# Patient Record
Sex: MALE | Race: WHITE
[De-identification: names, ages, dates, MRNs, and addresses within clinical notes are randomized per-mention and may not be internally consistent; named-entity substitution may affect disease eponyms.]

---

## 2020-06-05 ENCOUNTER — HOSPITAL ENCOUNTER (OUTPATIENT)
Dept: HOSPITAL 75 - PREOP | Age: 60
Discharge: HOME | End: 2020-06-05
Attending: UROLOGY
Payer: COMMERCIAL

## 2020-06-05 VITALS — WEIGHT: 285.5 LBS | HEIGHT: 70 IN | BODY MASS INDEX: 40.87 KG/M2

## 2020-06-05 DIAGNOSIS — R33.9: ICD-10-CM

## 2020-06-05 DIAGNOSIS — Z01.812: Primary | ICD-10-CM

## 2020-06-05 DIAGNOSIS — Z20.828: ICD-10-CM

## 2020-06-05 DIAGNOSIS — N40.0: ICD-10-CM

## 2020-06-05 PROCEDURE — 87635 SARS-COV-2 COVID-19 AMP PRB: CPT

## 2020-06-10 ENCOUNTER — HOSPITAL ENCOUNTER (OUTPATIENT)
Dept: HOSPITAL 75 - SDC | Age: 60
LOS: 2 days | Discharge: HOME | End: 2020-06-12
Attending: UROLOGY
Payer: COMMERCIAL

## 2020-06-10 VITALS — SYSTOLIC BLOOD PRESSURE: 131 MMHG | DIASTOLIC BLOOD PRESSURE: 73 MMHG

## 2020-06-10 VITALS — DIASTOLIC BLOOD PRESSURE: 66 MMHG | SYSTOLIC BLOOD PRESSURE: 160 MMHG

## 2020-06-10 VITALS — DIASTOLIC BLOOD PRESSURE: 83 MMHG | SYSTOLIC BLOOD PRESSURE: 159 MMHG

## 2020-06-10 VITALS — DIASTOLIC BLOOD PRESSURE: 74 MMHG | SYSTOLIC BLOOD PRESSURE: 132 MMHG

## 2020-06-10 VITALS — DIASTOLIC BLOOD PRESSURE: 81 MMHG | SYSTOLIC BLOOD PRESSURE: 147 MMHG

## 2020-06-10 VITALS — BODY MASS INDEX: 40.87 KG/M2 | WEIGHT: 285.5 LBS | HEIGHT: 70 IN

## 2020-06-10 VITALS — DIASTOLIC BLOOD PRESSURE: 84 MMHG | SYSTOLIC BLOOD PRESSURE: 158 MMHG

## 2020-06-10 VITALS — DIASTOLIC BLOOD PRESSURE: 90 MMHG | SYSTOLIC BLOOD PRESSURE: 156 MMHG

## 2020-06-10 VITALS — DIASTOLIC BLOOD PRESSURE: 79 MMHG | SYSTOLIC BLOOD PRESSURE: 135 MMHG

## 2020-06-10 VITALS — DIASTOLIC BLOOD PRESSURE: 82 MMHG | SYSTOLIC BLOOD PRESSURE: 155 MMHG

## 2020-06-10 VITALS — DIASTOLIC BLOOD PRESSURE: 83 MMHG | SYSTOLIC BLOOD PRESSURE: 144 MMHG

## 2020-06-10 DIAGNOSIS — Z11.2: ICD-10-CM

## 2020-06-10 DIAGNOSIS — N31.9: ICD-10-CM

## 2020-06-10 DIAGNOSIS — N32.81: ICD-10-CM

## 2020-06-10 DIAGNOSIS — N40.1: Primary | ICD-10-CM

## 2020-06-10 DIAGNOSIS — R32: ICD-10-CM

## 2020-06-10 LAB
BASOPHILS # BLD AUTO: 0 10^3/UL (ref 0–0.1)
BASOPHILS NFR BLD AUTO: 0 % (ref 0–10)
EOSINOPHIL # BLD AUTO: 0.3 10^3/UL (ref 0–0.3)
EOSINOPHIL NFR BLD AUTO: 4 % (ref 0–10)
ERYTHROCYTE [DISTWIDTH] IN BLOOD BY AUTOMATED COUNT: 14.3 % (ref 10–14.5)
HCT VFR BLD CALC: 35 % (ref 40–54)
HGB BLD-MCNC: 11.4 G/DL (ref 13.3–17.7)
LYMPHOCYTES # BLD AUTO: 1.7 X 10^3 (ref 1–4)
LYMPHOCYTES NFR BLD AUTO: 20 % (ref 12–44)
MANUAL DIFFERENTIAL PERFORMED BLD QL: NO
MCH RBC QN AUTO: 28 PG (ref 25–34)
MCHC RBC AUTO-ENTMCNC: 32 G/DL (ref 32–36)
MCV RBC AUTO: 85 FL (ref 80–99)
MONOCYTES # BLD AUTO: 0.7 X 10^3 (ref 0–1)
MONOCYTES NFR BLD AUTO: 8 % (ref 0–12)
NEUTROPHILS # BLD AUTO: 5.9 X 10^3 (ref 1.8–7.8)
NEUTROPHILS NFR BLD AUTO: 69 % (ref 42–75)
PLATELET # BLD: 269 10^3/UL (ref 130–400)
PMV BLD AUTO: 9.7 FL (ref 7.4–10.4)
WBC # BLD AUTO: 8.6 10^3/UL (ref 4.3–11)

## 2020-06-10 PROCEDURE — 87081 CULTURE SCREEN ONLY: CPT

## 2020-06-10 PROCEDURE — 36415 COLL VENOUS BLD VENIPUNCTURE: CPT

## 2020-06-10 PROCEDURE — 94664 DEMO&/EVAL PT USE INHALER: CPT

## 2020-06-10 PROCEDURE — 86901 BLOOD TYPING SEROLOGIC RH(D): CPT

## 2020-06-10 PROCEDURE — 82962 GLUCOSE BLOOD TEST: CPT

## 2020-06-10 PROCEDURE — 86850 RBC ANTIBODY SCREEN: CPT

## 2020-06-10 PROCEDURE — 86900 BLOOD TYPING SEROLOGIC ABO: CPT

## 2020-06-10 PROCEDURE — 85025 COMPLETE CBC W/AUTO DIFF WBC: CPT

## 2020-06-10 RX ADMIN — PANTOPRAZOLE SODIUM SCH MG: 40 TABLET, DELAYED RELEASE ORAL at 12:41

## 2020-06-10 RX ADMIN — SODIUM CHLORIDE ONE MLS/HR: 900 INJECTION INTRAVENOUS at 19:47

## 2020-06-10 RX ADMIN — LOSARTAN POTASSIUM SCH MG: 100 TABLET, FILM COATED ORAL at 12:41

## 2020-06-10 RX ADMIN — SODIUM CHLORIDE, SODIUM LACTATE, POTASSIUM CHLORIDE, AND CALCIUM CHLORIDE SCH MLS/HR: 600; 310; 30; 20 INJECTION, SOLUTION INTRAVENOUS at 22:51

## 2020-06-10 RX ADMIN — HYDROCODONE BITARTRATE AND ACETAMINOPHEN PRN EA: 10; 325 TABLET ORAL at 22:50

## 2020-06-10 RX ADMIN — SODIUM CHLORIDE ONE MLS/HR: 900 INJECTION INTRAVENOUS at 19:46

## 2020-06-10 RX ADMIN — DOCUSATE SODIUM SCH MG: 100 CAPSULE ORAL at 20:04

## 2020-06-10 RX ADMIN — HYDROCODONE BITARTRATE AND ACETAMINOPHEN PRN EA: 10; 325 TABLET ORAL at 12:18

## 2020-06-10 RX ADMIN — SODIUM CHLORIDE, SODIUM LACTATE, POTASSIUM CHLORIDE, AND CALCIUM CHLORIDE SCH MLS/HR: 600; 310; 30; 20 INJECTION, SOLUTION INTRAVENOUS at 17:05

## 2020-06-10 RX ADMIN — HYDROCODONE BITARTRATE AND ACETAMINOPHEN PRN EA: 10; 325 TABLET ORAL at 15:35

## 2020-06-10 RX ADMIN — METFORMIN HYDROCHLORIDE SCH MG: 500 TABLET, EXTENDED RELEASE ORAL at 17:03

## 2020-06-10 RX ADMIN — SODIUM CHLORIDE, SODIUM LACTATE, POTASSIUM CHLORIDE, AND CALCIUM CHLORIDE SCH MLS/HR: 600; 310; 30; 20 INJECTION, SOLUTION INTRAVENOUS at 12:41

## 2020-06-10 NOTE — XMS REPORT
[facility] Clinical Summary

                             Created on: 2017



Loki Tao

External Reference #: [id number]

: 1960

Sex: Male



Demographics





                          Address                   [address] [place] [address]

 

                          Home Phone                [email or web address]

 

                          Preferred Language        English

 

                          Marital Status            M

 

                          Hoahaoism Affiliation     Unknown

 

                          Race                      Unknown

 

                          Ethnic Group              Not  or 





Author





                          Author                    Admin, Loki AQUINO

 

                          Organization              [facility]

 

                          Address                   Unknown

 

                          Phone                     Unavailable







Allergies, Adverse Reactions, Alerts





           Allergy Name Reaction Description Start Date Severity   Status     Pr

ovider

 

           NKA                    Critical   Active     Kenny aguilera MD







Conditions or Problems





        Problem Name Problem Code Onset Date Status  Entry Date Provider Comment

 Standard

Description                             Annotate

 

             Umbilical hernia without obstruction or gangrene                 Resolved     

2017/10/31          Kenny Dwyer MD                     Umbilical hernia 

without mention of 

obstruction or gangrene                  

 

        Ventral hernia 553.20   Resolved 2017/10/31 Kenny laboy MD         

Unspecified ventral hernia without mention of obstruction or gangrene  

 

             Umbilical hernia without obstruction or gangrene              2017/

09/26   2017/10/31   

Inactive            Kenny Dwyer MD 2017/10/31           

 

          Ventral hernia ICD-553.20  2017/10/31 Inactive  Kenny Conway MD 

2017/10/31                               







Medication List





        Medication Instructions Start Date Stop Date Generic Name NDC     Status

  Provider 

Patient Instruction

 

          COZAAR 50 MG ORAL TABS 1 tab daily            LOSARTAN POTAS

SIUM [id number] 

Active              Caitlyn Moreno RN                     Active

 

          OMEPRAZOLE 20 MG ORAL CPDR 1 tab daily            OMEPRAZOLE

 [id number] 

Active              Caitlyn Moreno RN                     Active







Advance Directives





                          Directive Description     Start Date

 

                          PERMISSION TO SHARE       







Vital Signs





           Date       Name       Value      Unit       Range      Description

 

           2017/10/31 blood pressure, diastolic 80         mm[Hg]               

 BP sarah

 

           2017/10/31 blood pressure, systolic 160        mm[Hg]                

BP sys

 

           2017/10/31 height E&M 69         [in_us]               Bdy height

 

           2017/10/31 pulse rate E&M 70         /min                  Heart rate

 

           2017/10/31 temperature E&M 99.1       [degF]                Body temp

erature

 

           2017/10/31 weight E&M 312.4      [lb_av]               Weight Measure

d

 

            blood pressure, diastolic 90         mm[Hg]               

 BP sarah

 

            blood pressure, systolic 150        mm[Hg]                

BP sys

 

            height E&M 69         [in_us]               Bdy height

 

            pulse rate E&M 74         /min                  Heart rate

 

            temperature E&M 98.8       [degF]                Body temp

erature

 

            weight E&M 305        [lb_av]               Weight Measure

d







Encounters





             Code         Encounter    Date         Provider     Facility

 

             CPT-30797    Level 4 New Patient  13:26:59 CDT Kenny Dwyer MD 

[facility]







Procedures





             Code         Procedure Name Date         Entry Date   Standard Desc

ription

 

             CPT-99730    Postop F/U Visit 2017/10/31 14:49:05 CDT 2017/10/31

## 2020-06-10 NOTE — PROGRESS NOTE-POST OPERATIVE
Post-Operative Progess Note


Surgeon (s)/Assistant (s)


Surgeon


ELIAS TAWIL MD


Assistant:  NONE





Pre-Operative Diagnosis


BPH, NEUROGENIC BLADDER WITH RETENTION AND INCONTINENCE





Post-Operative Diagnosis





SAME





Procedure & Operative Findings


Date of Procedure


6/10/20


Procedure Performed/Findings


TURP


Anesthesia Type


SPINAL





Estimated Blood Loss


Estimated blood loss (mL):  150cc





Specimens/Packing


Specimens Removed


PROSTATE CHIPS


Packing:  


NONE











TAWIL,ELIAS A MD               Víctor 10, 2020 07:06

## 2020-06-10 NOTE — XMS REPORT
[facility] Clinical Summary

                             Created on: 10/10/2017



Loki Tao

External Reference #: [id number]

: 1960

Sex: Male



Demographics





                          Address                   [address] [place] [address]

 

                          Home Phone                [email or web address]

 

                          Preferred Language        English

 

                          Marital Status            M

 

                          Yarsanism Affiliation     Unknown

 

                          Race                      Unknown

 

                          Ethnic Group              Not  or 





Author





                          Author                    Admin, Loki AQUINO

 

                          Organization              [facility]

 

                          Address                   Unknown

 

                          Phone                     Unavailable







Allergies, Adverse Reactions, Alerts





           Allergy Name Reaction Description Start Date Severity   Status     Pr

ovider

 

           NKA                    Critical   Active     Kenny aguilera MD







Conditions or Problems





        Problem Name Problem Code Onset Date Status  Entry Date Provider Comment

 Standard

Description                             Annotate

 

           Umbilical hernia without obstruction or gangrene            

6 Active      

Kenny Dwyer MD                              Umbilical hernia without men

tion of obstruction or 

gangrene                                 

 

        Ventral hernia 553.20   Active   Kenny Dwyer MD         

Unspecified ventral hernia without mention of obstruction or gangrene  







Medication List





        Medication Instructions Start Date Stop Date Generic Name NDC     Status

  Provider 

Patient Instruction

 

          COZAAR 50 MG ORAL TABS 1 tab daily            LOSARTAN POTAS

SIUM [id number] 

Active              Caitlyn Moreno RN                     Active

 

          OMEPRAZOLE 20 MG ORAL CPDR 1 tab daily            OMEPRAZOLE

 [id number] 

Active              Caitlyn Moreno RN                     Active







Advance Directives





                          Directive Description     Start Date

 

                          PERMISSION TO SHARE       







Vital Signs





           Date       Name       Value      Unit       Range      Description

 

            blood pressure, diastolic 90         mm[Hg]               

 BP sarah

 

            blood pressure, systolic 150        mm[Hg]                

BP sys

 

            height E&M 69         [in_us]               Bdy height

 

            pulse rate E&M 74         /min                  Heart rate

 

            temperature E&M 98.8       [degF]                Body temp

erature

 

            weight E&M 305        [lb_av]               Weight Measure

d







Encounters





             Code         Encounter    Date         Provider     Facility

 

             CPT-23574    Level 4 New Patient  13:26:59 CDT Kenny Dwyer MD 

[facility]

## 2020-06-10 NOTE — XMS REPORT
[facility] Clinical Summary

                             Created on: 10/02/2017



Loki Tao

External Reference #: [id number]

: 1960

Sex: Male



Demographics





                          Address                   [address] [place] [address]

 

                          Home Phone                [email or web address]

 

                          Preferred Language        English

 

                          Marital Status            M

 

                          Gnosticist Affiliation     Unknown

 

                          Race                      Unknown

 

                          Ethnic Group              Not  or 





Author





                          Author                    Admin, Loki AQUINO

 

                          Organization              [facility]

 

                          Address                   Unknown

 

                          Phone                     Unavailable







Allergies, Adverse Reactions, Alerts





           Allergy Name Reaction Description Start Date Severity   Status     Pr

ovider

 

           NKA                    Critical   Active     Kenny aguilera MD







Conditions or Problems





        Problem Name Problem Code Onset Date Status  Entry Date Provider Comment

 Standard

Description                             Annotate

 

           Umbilical hernia without obstruction or gangrene            

6 Active      

Kenny Dwyer MD                              Umbilical hernia without men

tion of obstruction or 

gangrene                                 

 

        Ventral hernia 553.20   Active   Kenny Dwyer MD         

Unspecified ventral hernia without mention of obstruction or gangrene  







Medication List





        Medication Instructions Start Date Stop Date Generic Name NDC     Status

  Provider 

Patient Instruction

 

          COZAAR 50 MG ORAL TABS 1 tab daily            LOSARTAN POTAS

SIUM [id number] 

Active              Caitlyn Moreno RN                     Active

 

          OMEPRAZOLE 20 MG ORAL CPDR 1 tab daily            OMEPRAZOLE

 [id number] 

Active              Caitlyn Moreno RN                     Active







Advance Directives





                          Directive Description     Start Date

 

                          PERMISSION TO SHARE       







Vital Signs





           Date       Name       Value      Unit       Range      Description

 

            blood pressure, diastolic 90         mm[Hg]               

 BP sarah

 

            blood pressure, systolic 150        mm[Hg]                

BP sys

 

            height E&M 69         [in_us]               Bdy height

 

            pulse rate E&M 74         /min                  Heart rate

 

            temperature E&M 98.8       [degF]                Body temp

erature

 

            weight E&M 305        [lb_av]               Weight Measure

d







Encounters





             Code         Encounter    Date         Provider     Facility

 

             CPT-82187    Level 4 New Patient  13:26:59 CDT Kenny Dwyer MD 

[facility]

## 2020-06-10 NOTE — PROGRESS NOTE-PRE OPERATIVE
Pre-Operative Progress Note


H&P Reviewed


The H&P was reviewed, patient examined and no changes noted.


Date Seen by Provider:  Víctor 10, 2020


Time Seen by Provider:  07:02


Date H&P Reviewed:  Víctor 10, 2020


Time H&P Reviewed:  07:02


Pre-Operative Diagnosis:  BPH, NEUROGENIC BLADDER WITH RETENTION AND 

INCONTINENCE











TAWIL,ELIAS A MD               Víctor 10, 2020 07:03

## 2020-06-10 NOTE — XMS REPORT
[facility] Clinical Summary

                             Created on: 2017



Loki Tao

External Reference #: [id number]

: 1960

Sex: Male



Demographics





                          Address                   [address] [place] [address]

 

                          Home Phone                [email or web address]

 

                          Preferred Language        English

 

                          Marital Status            M

 

                          Christianity Affiliation     Unknown

 

                          Race                      Unknown

 

                          Ethnic Group              Not  or 





Author





                          Author                    Admin, Loki AQUINO

 

                          Organization              [facility]

 

                          Address                   Unknown

 

                          Phone                     Unavailable







Allergies, Adverse Reactions, Alerts





           Allergy Name Reaction Description Start Date Severity   Status     Pr

ovider

 

           NKA                    Critical   Active     Kenny aguilera MD







Conditions or Problems





        Problem Name Problem Code Onset Date Status  Entry Date Provider Comment

 Standard

Description                             Annotate

 

           Umbilical hernia without obstruction or gangrene            

6 Active      

Kenny Dwyer MD                              Umbilical hernia without men

tion of obstruction or 

gangrene                                 

 

        Ventral hernia 553.20   Active   Kenny Dwyer MD         

Unspecified ventral hernia without mention of obstruction or gangrene  







Medication List





        Medication Instructions Start Date Stop Date Generic Name NDC     Status

  Provider 

Patient Instruction

 

          COZAAR 50 MG ORAL TABS 1 tab daily            LOSARTAN POTAS

SIUM [id number] 

Active              Caitlyn Moreno RN                     Active

 

          OMEPRAZOLE 20 MG ORAL CPDR 1 tab daily            OMEPRAZOLE

 [id number] 

Active              Caitlyn Moreno RN                     Active







Vital Signs





           Date       Name       Value      Unit       Range      Description

 

            blood pressure, diastolic 90         mm[Hg]               

 BP sarah

 

            blood pressure, systolic 150        mm[Hg]                

BP sys

 

            height E&M 69         [in_us]               Bdy height

 

            pulse rate E&M 74         /min                  Heart rate

 

            temperature E&M 98.8       [degF]                Body temp

erature

 

            weight E&M 305        [lb_av]               Weight Measure

d







Encounters





             Code         Encounter    Date         Provider     Facility

 

             CPT-77628    Level 4 New Patient  13:26:59 CDT Kenny Dwyer MD 

[facility]

## 2020-06-10 NOTE — XMS REPORT
[facility] Ambulatory Summary

                             Created on: 2019



Loki Tao

External Reference #: [id number]

: 1960

Sex: Male



Demographics





                          Address                   [address] [place]  [address]

 

                          Home Phone                [phone]

 

                          Preferred Language        English

 

                          Marital Status            Never 

 

                          Sikh Affiliation     Unknown

 

                          Race                      White

 

                          Ethnic Group              Unknown





Author





                          Author                    Loki Coronel

 

                          Organization              [facility] 

oup

 

                          Address                   [address]

[place], [place]  [address]



 

                          Phone                     [phone]







Care Team Providers





                    Care Team Member Name Role                Phone

 

                    Francis Coronel   PCP                 [phone]

 

                    Francis Coronel   PreferredProvider   [phone]







Allergies and Adverse Reactions





                    Name                Reaction            Notes

 

                    No known drug allergy                      







Plan of Treatment





             Planned Activity Comments     Planned Date Planned Time Plan/Goal

 

             URINALYSIS ROUTINE C&S IF IND              4/3/2019     12:00 AM   

   







Medications





                                        Active 

 

             Name         Start Date   Estimated Completion Date SIG          Co

mments

 

             losartan 100 mg oral tablet                           take 1 tablet

 (100 mg) by oral route once daily 



 

                omeprazole 20 mg oral capsule,delayed release(DR/EC)            

                     take 1 capsule (20 mg) 

by oral route once daily before a meal   

 

                atorvastatin 20 mg oral tablet                                 t

jackelyn 1 tablet (20 mg) by oral route once 

daily                                    

 

                levothyroxine 25 mcg oral tablet                                

 take 1 tablet (25 mcg) by oral route once 

daily                                    

 

                metformin 500 mg oral tablet                                 porfirio

e 1 tablet (500 mg) by oral route 2 times 

per day with morning and evening meals   







Problem List





                    Description         Status              Onset

 

                    Acute cystitis without hematuria Active              4/3/201

9







Vital Signs





     Date Time BP-Sys(mm[Hg] BP-Alexsandra(mm[Hg]) HR(bpm) RR(rpm) Temp WT   HT   HC   

BMI  BSA  BMI

 Percentile                             O2 Sat(%)

 

      4/3/2019 8:34:00  mmHg 78 mmHg 76 bpm       98 F  292 lbs 69 in     

  43.1204 

kg/m              2.5393 m                              93 %







Social History





                    Name                Description         Comments

 

                    Tobacco             Never smoker         







History of Procedures

Not available.



Results Summary

Not available.



History Of Immunizations

Not available.



History of Past Illness





                    Name                Date of Onset       Comments

 

                    Type 2 diabetes mellitus                      

 

                    Hypothyroid                              

 

                    High cholesterol                         

 

                    HTN (hypertension)                       

 

                    Acute cystitis without hematuria 2019           

 

                    Dysuria             Apr  3 2019  8:54AM  







Payers





           Insurance Name Company Name Plan Name  Plan Number Policy Number Yahir

cy Group 

Number                                  Start Date

 

                    [facility] [address] [facility]           [id number]

2           N/A







History of Encounters





                    Visit Date          Visit Type          Provider

 

                    4/3/2019            Office visit        Francis KNIGHT

## 2020-06-10 NOTE — XMS REPORT
[facility] Clinical Summary

                             Created on: 2017



Loki Tao

External Reference #: [id number]

: 1960

Sex: Male



Demographics





                          Home Phone                [phone]

 

                          Preferred Language        Unknown

 

                          Marital Status            Unknown

 

                          Yazidi Affiliation     Unknown

 

                          Race                      Unknown

 

                          Ethnic Group              Unknown





Author





                          Author                    Admin, Loki WISE

 

                          Organization              [facility]

 

                          Address                   Unknown

 

                          Phone                     Unavailable







Allergies, Adverse Reactions, Alerts





           Allergy Name Reaction Description Start Date Severity   Status     Pr

ovider

 

           Allergies Unknown                                              







Conditions or Problems





        Problem Name Problem Code Onset Date Status  Entry Date Provider Comment

 Standard

Description                             Annotate

 

        Problems Unknown                 Active                                 

  







Medication List





        Medication Instructions Start Date Stop Date Generic Name NDC     Status

  Provider 

Patient Instruction

 

          COZAAR 50 MG ORAL TABS 1 tab daily            LOSARTAN POTAS

SIUM [id number] 

Active              Caitlyn Moreno RN                     Active

 

          OMEPRAZOLE 20 MG ORAL CPDR 1 tab daily            OMEPRAZOLE

 [id number] 

Active              Caitlyn Moreno RN                     Active

## 2020-06-10 NOTE — XMS REPORT
MU2 Ambulatory Summary

                             Created on: 2019



Loki Tao

External Reference #: [id number]

: 1960

Sex: Male



Demographics





                          Address                   [address] [place]  [address]

 

                          Home Phone                [phone]

 

                          Preferred Language        English

 

                          Marital Status            Never 

 

                          Latter-day Affiliation     Unknown

 

                          Race                      White

 

                          Ethnic Group              Unknown





Author





                          Author                    Loki Coronel

 

                          Organization              [facility]

oup

 

                          Address                   [address]

[place], [place]  [address]



 

                          Phone                     [phone]







Care Team Providers





                    Care Team Member Name Role                Phone

 

                    Francis Coronel   PCP                 [phone]

 

                    Francis Coronel   PreferredProvider   [phone]







Allergies and Adverse Reactions





                    Name                Reaction            Notes

 

                    No known drug allergy                      







Plan of Treatment





             Planned Activity Comments     Planned Date Planned Time Plan/Goal

 

             URINALYSIS ROUTINE C&S IF IND              4/3/2019     12:00 AM   

   

 

             Urine dipstick test without microscopy              4/3/2019     12

:00 AM      







Medications





                                        Active 

 

             Name         Start Date   Estimated Completion Date SIG          Co

mments

 

             losartan 100 mg oral tablet                           take 1 tablet

 (100 mg) by oral route once daily 



 

                omeprazole 20 mg oral capsule,delayed release(DR/EC)            

                     take 1 capsule (20 mg) 

by oral route once daily before a meal   

 

                atorvastatin 20 mg oral tablet                                 t

jackelyn 1 tablet (20 mg) by oral route once 

daily                                    

 

                levothyroxine 25 mcg oral tablet                                

 take 1 tablet (25 mcg) by oral route once 

daily                                    

 

                metformin 500 mg oral tablet                                 porfirio

e 1 tablet (500 mg) by oral route 2 times 

per day with morning and evening meals   

 

                Bactrim -160 mg oral tablet 4/3/2019        2019     

  take 1 tablet by oral route

every 12 hours for 10 days               







Problem List





                    Description         Status              Onset

 

                    Acute cystitis without hematuria Active              4/3/201

9

 

                                        Diabetes mellitus due to underlying cond

ition without complication, without 

long-term current use of insulin Active                    4/3/2019

 

                    Other specified hypothyroidism Active              4/3/2019

 

                    Pure hypercholesterolemia Active              4/3/2019

 

                    Essential hypertension Active              4/3/2019







Vital Signs





     Date Time BP-Sys(mm[Hg] BP-Alexsandra(mm[Hg]) HR(bpm) RR(rpm) Temp WT   HT   HC   

BMI  BSA  BMI

 Percentile                             O2 Sat(%)

 

      4/3/2019 8:34:00  mmHg 78 mmHg 76 bpm       98 F  292 lbs 69 in     

  43.1204 

kg/m              2.5393 m                              93 %







Social History





                    Name                Description         Comments

 

                    Tobacco             Never smoker         







History of Procedures

Not available.



Results Summary

Not available.



History Of Immunizations

Not available.



History of Past Illness





                    Name                Date of Onset       Comments

 

                    Type 2 diabetes mellitus                      

 

                    Hypothyroid                              

 

                    High cholesterol                         

 

                    HTN (hypertension)                       

 

                    Acute cystitis without hematuria 2019           

 

                                        Diabetes mellitus due to underlying cond

ition without complication, without 

long-term current use of insulin 2019                 

 

                    Other specified hypothyroidism 2019           

 

                    Pure hypercholesterolemia 2019           

 

                    Essential hypertension 2019           

 

                    Dysuria             Apr  3 2019  8:54AM  

 

                    Acute cystitis without hematuria Apr  3 2019  8:37AM  

 

                                        Diabetes mellitus due to underlying cond

ition without complication, without 

long-term current use of insulin Apr  3 2019  8:37AM        

 

                    Essential hypertension Apr  3 2019  8:37AM  

 

                    Pure hypercholesterolemia Apr  3 2019  8:37AM  

 

                    Other specified hypothyroidism Apr  3 2019  8:37AM  

 

                    Dysuria             Apr  3 2019  9:03AM  







Payers





           Insurance Name Company Name Plan Name  Plan Number Policy Number [id number]

cy Group 

Number                                  Start Date

 

                    [facility] [address] [facility]           [id number]

2           N/A







History of Encounters





                    Visit Date          Visit Type          Provider

 

                    4/3/2019            Office visit        Francis KNIGHT

## 2020-06-10 NOTE — XMS REPORT
[facility] Clinical Summary

                             Created on: 2017



Dinh Loki

External Reference #: [id number]

: 1960

Sex: Male



Demographics





                          Address                   [address] [place] [address]

 

                          Home Phone                [phone]

 

                          Preferred Language        English

 

                          Marital Status            M

 

                          Confucianist Affiliation     Unknown

 

                          Race                      Unknown

 

                          Ethnic Group              Not  or 





Author





                          Author                    Admin, Loki WISE

 

                          Organization              [facility]

 

                          Address                   Unknown

 

                          Phone                     Unavailable







Allergies, Adverse Reactions, Alerts





           Allergy Name Reaction Description Start Date Severity   Status     Pr

ovider

 

           Allergies Unknown                                              







Conditions or Problems





        Problem Name Problem Code Onset Date Status  Entry Date Provider Comment

 Standard

Description                             Annotate

 

        Problems Unknown                 Active                                 

  







Medication List





        Medication Instructions Start Date Stop Date Generic Name NDC     Status

  Provider 

Patient Instruction

 

          COZAAR 50 MG ORAL TABS 1 tab daily            LOSARTAN POTAS

SIUM [id number] 

Active              Caitlyn Moreno RN                     Active

 

          OMEPRAZOLE 20 MG ORAL CPDR 1 tab daily            OMEPRAZOLE

 [id number] 

Active              Caitlyn Moreno RN                     Active

## 2020-06-10 NOTE — XMS REPORT
[facility] Clinical Summary

                             Created on: 10/20/2017



Loki Tao

External Reference #: [id number]

: 1960

Sex: Male



Demographics





                          Address                   [address] [place] [address]

 

                          Home Phone                [email or web address]

 

                          Preferred Language        English

 

                          Marital Status            M

 

                          Jew Affiliation     Unknown

 

                          Race                      Unknown

 

                          Ethnic Group              Not  or 





Author





                          Author                    Admin, Loki AQUINO

 

                          Organization              [facility]

 

                          Address                   Unknown

 

                          Phone                     Unavailable







Allergies, Adverse Reactions, Alerts





           Allergy Name Reaction Description Start Date Severity   Status     Pr

ovider

 

           NKA                    Critical   Active     Kenny aguilera MD







Conditions or Problems





        Problem Name Problem Code Onset Date Status  Entry Date Provider Comment

 Standard

Description                             Annotate

 

           Umbilical hernia without obstruction or gangrene            

6 Active      

Kenny Dwyer MD                              Umbilical hernia without men

tion of obstruction or 

gangrene                                 

 

        Ventral hernia 553.20   Active   Kenny Dwyer MD         

Unspecified ventral hernia without mention of obstruction or gangrene  







Medication List





        Medication Instructions Start Date Stop Date Generic Name NDC     Status

  Provider 

Patient Instruction

 

          COZAAR 50 MG ORAL TABS 1 tab daily            LOSARTAN POTAS

SIUM [id number] 

Active              Caitlyn Moreno RN                     Active

 

          OMEPRAZOLE 20 MG ORAL CPDR 1 tab daily            OMEPRAZOLE

 [id number] 

Active              Caitlyn Moreno RN                     Active







Advance Directives





                          Directive Description     Start Date

 

                          PERMISSION TO SHARE       







Vital Signs





           Date       Name       Value      Unit       Range      Description

 

            blood pressure, diastolic 90         mm[Hg]               

 BP sarah

 

            blood pressure, systolic 150        mm[Hg]                

BP sys

 

            height E&M 69         [in_us]               Bdy height

 

            pulse rate E&M 74         /min                  Heart rate

 

            temperature E&M 98.8       [degF]                Body temp

erature

 

            weight E&M 305        [lb_av]               Weight Measure

d







Encounters





             Code         Encounter    Date         Provider     Facility

 

             [id number]    Level 4 New Patient  13:26:59 CDT Kenny Dwyer MD 

[facility]

## 2020-06-10 NOTE — NUR
RECEIVED FROM RECOVERY POST TURP, REPORT FROM DANIEL HAWLEY, PATIENT ALERT AND ORIENTED, CBI OF NS 
CONTINUED, PINK TINGED URINE, IV SITE WITHOUT REDNESS OR SWELLING, DENIES PAIN AT THIS TIME, 
O2 SAT 98 PERCENT, SCD'S ON, ORIENTED TO ROOM, CALL LIGHT WITHIN REACH.

## 2020-06-10 NOTE — XMS REPORT
Continuity of Care Document

                             Created on: 06/10/2020



ANTON QUINONES

External Reference #: [id number]

: 1960

Sex: Male



Demographics





                          Home Phone                [phone]

 

                          Preferred Language        Unknown

 

                          Marital Status            Unknown

 

                          Temple Affiliation     Unknown

 

                          Race                      Unknown

 

                          Ethnic Group              Unknown





Author





                          Organization              Unknown

 

                          Address                   Unknown

 

                          Phone                     Unavailable



              



Allergies

      



             Active           Description           Code           Type         

  Severity   

                Reaction           Onset           Reported/Identified          

 

Relationship to Patient                 Clinical Status        

 

             Yes           No known allergies                        Drug       

    N/A       

             N/A                                                           

 

           Yes           NKDA                                   N/A           N/

A          

                                                                         

 

             Yes           No Known Drug Allergies           [id number]           

             

N/A           N/A                                                           

 

                Yes             No Known Environmental Allergies           51040

997               

           N/A           N/A                                                    

    

 

             Yes           No Known Food Allergies           [id number]           

             

N/A           N/A                                                           

 

                Yes             No Known Drug Allergies           [id number]    

       Drug 

Allergy           Unknown           N/A                             2020  

      

                                                             



                            



Medications

      



                Medication           Packaging           Start Date           St

op Date         

                    Route               Dosage              Sig        

 

                            LISINOPRIL                                     08/15

/2017               

                    ORAL                30                              daily   

               

 

                            LEVAQUIN                                     08/15/2

017                 

                    ORAL                7                               daily   

               

 

                                ALBUTEROL SULFATE                               

          08/15/2017        

                                Inhalation           2                          

 every 6 hours        

         

 

                            ZITHROMAX                                     2017                

                    ORAL                6                                       

          

 

                            PREDNISONE                                                    

                    ORAL                9                                       

          



                          



Problems

      



             Date Dx Coded           Attending           Type           Code    

       

Diagnosis                               Diagnosed By        

 

             2018           DERREK GODINEZ            

J40           

Bronchitis, not specified as acute or chronic                    

 

                2018           DERREK GODINEZ         

      E11.9         

                          Type 2 diabetes mellitus without complications        

            

 

             2019                        Final           E03.9           H

ypothyroidism,

unspecified                                      

 

             2019                        Reason For Visit           E11.9 

          Type

2 diabetes mellitus without complications                    

 

             2019                        Final           E78.00           

Pure 

hypercholesterolemia, unspecified                    

 

             2020                        S            E039           Hypot

hyroidism, 

unspecified                                      

 

             2020                        S            E119           Type 

2 diabetes 

mellitus without complications                    

 

             2020                        S            E669           Obesi

ty, unspecified

                                                 

 

             2020                        S                       Pure

 

hypercholesterolemia, unspecified                    

 

             2020                        S            I10           Essent

ial (primary) 

hypertension                                     

 

             2020                        S            K219           Gastr

o-esophageal 

reflux disease without esophagitis                    

 

             2020                        P                       Unil

ateral primary 

osteoarthritis, right knee                       

 

             2020                        S                       Body

 mass index 

(BMI) 40.0-44.9, adult                           

 

             2020                        S                       Long

 term (current)

use of oral hypoglycemic drugs                    

 

             2020                        S            R02739           Oth

er long term 

(current) drug therapy                           

 

             2020                        S                       Othe

r acute 

postprocedural pain                              

 

             2020                        P            M546           Pain 

in thoracic 

spine                                            

 

             2020                        S            R509           Fever

, unspecified  

                                                 

 

             2020                        P            M546           Pain 

in thoracic 

spine                                            

 

             2020                        S            R509           Fever

, unspecified  

                                                 

 

             2020                        S            [id number]           Elijah

n in right knee

                                                 

 

             2020                        S            R262           Diffi

culty in 

walking, not elsewhere classified                    

 

             2020                        P            Z471           After

care following 

joint replacement surgery                        

 

             2020                        S            [id number]           Pre

sence of right 

artificial knee joint                            

 

             2020                        S            [id number]           Elijah

n in right knee

                                                 

 

             2020                        S            R262           Diffi

culty in 

walking, not elsewhere classified                    

 

             2020                        P            Z471           After

care following 

joint replacement surgery                        

 

             2020                        S            [id number]           Pre

sence of right 

artificial knee joint                            



                                                                        



Procedures

      



                Code            Description           Performed By           Per

ambreen On        

 

                                      14055                                 COMP

REHEN METABOLIC PANEL     

                          DERREK GODINEZ           2018        

 

                                      69598                                 GLYC

OSYLATED HEMOGLOBIN TEST  

                          DERREK GODINEZ           2018        

 

                                      02073                                 COMP

LETE CBC W/AUTO DIFF WBC  

                          DERREK GODINEZ           2018        

 

                                      91159                                 LIPI

D PANEL                   

                          DERREK GODINEZ           2018        



                        



Results

      



                    Test                Result              Range        

 

                                        CMP - 19 09:15         

 

                    Glucose Lvl           116 mg/dL           70-99        

 

                    Sodium Lvl           139 mmol/L           136-145        

 

                    Potassium Lvl           4.6 mmol/L           3.5-5.1        

 

                    Chloride            102 mmol/L                   

 

                    CO2                 30 mmol/L           22-29        

 

                    Calcium Lvl           9.6 mg/dL           8.4-10.2        

 

                    BUN                 16 mg/dL            7-26        

 

                    Creatinine Lvl           0.8 mg/dL           0.6-1.3        

 

                    Bili Total           0.7 mg/dL           0.2-1.2        

 

                    Total Protein           7.5 gm/dL           6.4-8.3        

 

                    Albumin Lvl           3.7 gm/dL           3.5-5.0        

 

                    Alk Phos            60 unit/L                   

 

                    AST                 21 unit/L           5-34        

 

                    ALT                 15 unit/L           0-55        

 

                    Globulin            3.8 gm/dL           2.4-3.5        

 

                    A/G Ratio           1.0 gm/dL           1.2-2.2        

 

                    BUN/Creat Ratio           20 ratio            7-25        

 

                    Osmolality Calc           271 mOsm/kg           261-280     

   

 

                    AGAP                7 mmol/L            5-16        

 

                    eGFR AA             120 ""              >=60        

 

                    eGFR Non-AA           99 ""               >=60        

 

                                        TSH - 19 09:15         

 

                    TSH                 1.27 mcunit/mL           0.35-4.94      

  

 

                                        Lipid Panel Screen - 19 09:15     

    

 

                    Chol                104 mg/dL           0-199        

 

                    Trig                71 mg/dL            0-149        

 

                    HDL                 31 mg/dL            40-60        

 

                    LDL                 59 mg/dL            0-130        

 

                                        U Microalb/Creat - 19 09:15       

  

 

                    U Microalb           30.0 mg/L           <=10.0        

 

                    U Creatinine           200 mg/dL                    

 

                    U Microalb/Creat           <30 mg/g ""           <30 mg/g   

     

 

                                        Hgb A1c - 19 09:15         

 

                    Hgb A1c             6.3 %               <=6.0        

 

                                        .Mean Plasma Glucose(MPG) - 19 09:

15         

 

                    Mean Plasma Glucose           134 mg/dL                    

 

                                        UA ROUTINE C&S IF IND - 20 10:05  

       

 

                    GLUCOSE             Normal              NL: NEGATIVE  mg/dl 

       

 

                     COLOR              Light-Yellow            NL: YELLOW      

  

 

                     CLARITY            Clear               NL: CLEAR        

 

                     SPEC GRAV           1.007               NL: 1.002 - 1.022  

      

 

                     pH                 6.0                 NL: 5 - 9        

 

                     PROTEIN            Negative            NL: NEGATIVE  mg/dl 

       

 

                     KETONE             Negative            NL: NEGATIVE  mg/dl 

       

 

                     BILIRUBIN           Negative            NL: NEGATIVE       

 

 

                     BLOOD              Negative            NL: NEGATIVE        

 

                     NITRITE            Negative            NL: NEGATIVE        

 

                     LEUK SCREEN           25                  NL: NEGATIVE     

   

 

                     RBC/HPF            0-3                 NL: NONE SEEN       

 

 

                     WBC/HPF            0-5                 NL: NONE SEEN       

 

 

                     BACTERIA/HPF           None Seen            NL: NONE SEEN  

      

 

                     SQUAMOUS EPI/LPF           Few                 NL: NONE SEE

N        

 

                     MUCOUS/LPF           Few                 NL: NONE SEEN     

   

 

                     CULT SET UP?           NO                  NRG        

 

                    UA ROUTINE C&S IF IND           N/A                 NRG     

   

 

                     Micro Indicated?           MICRO IND            NRG        

 

                                        CBC W/ AUTO DIFF (RFLX MAN DIFF IF IND) 

- 20 10:05         

 

                    CBC W/ AUTO DIFF (RFLX MAN DIFF IF IND)           N/A       

          NRG        

 

                    WBC                 4.8 TH/CMM           4.5-10.8        

 

                    RBC                 4.44 ML/CMM           4.70-6.10        

 

                    HGB                 12.3 G/DL           14.0-18.0        

 

                    HCT                 37.9 %              42.0-52.0        

 

                    MCV                 85 FL               81-99        

 

                    MCH                 27.7 PG             27.0-33.0        

 

                    MCHC                32.5 G/DL           31.0-36.0        

 

                    RDW SD              42 FL               36-50        

 

                    RDW CV              13.6 %              0.0-14.8        

 

                    MPV                 9.7 FL              9.3-12.5        

 

                    PLT                 261 TH/CMM           130-440        

 

                    NRBC#               0.00 TH/CMM           0.00-0.00        

 

                    NRBC%               0.0 /100WBC           0.0-2.0        

 

                    %NEUT               57.4 %              NRG        

 

                    %LYMP               27.7 %              NRG        

 

                    %MONO               11.2 %              NRG        

 

                    %EOS                3.1 %               NRG        

 

                    %BASO               0.4 %               NRG        

 

                    #NEUT               2.76 TH/CMM           2.10-8.20        

 

                    #LYMP               1.33 TH/CMM           0.90-5.20        

 

                    #MONO               0.54 TH/CMM           0.16-1.00        

 

                    #EOS                0.15 TH/CMM           0.00-0.80        

 

                    #BASO               0.02 TH/CMM           0.00-0.20        

 

                    MANUAL DIFF           NOT IND             NRG        

 

                                        COMPREHENSIVE METABOLIC PANEL - 20

 10:05         

 

                    COMPREHENSIVE METABOLIC PANEL           N/A                 

NRG        

 

                    GLUCOSE             115 MG/DL                   

 

                    SODIUM              144 MEQ/L           135-148        

 

                    POTASSIUM           3.3 MEQ/L           3.5-5.3        

 

                    CHLORIDE            104 MEQ/L                   

 

                    CO2                 31 MEQ/L            22-29        

 

                    BUN                 11 MG/DL            8-22        

 

                    CREATININE           1.51 MG/DL           0.72-1.25        

 

                    SGOT/AST            15 IU/L             10-40        

 

                    SGPT/ALT            12 IU/L             8-54        

 

                    ALK PHOS            83 IU/L                     

 

                    TOTAL PROTEIN           6.9 G/DL            5.5-8.5        

 

                    ALBUMIN             3.5 G/DL            3.1-5.4        

 

                    TOTAL BILI           0.8 MG/DL           0.0-1.5        

 

                    CALCIUM             8.3 MG/DL           8.2-10.6        

 

                    AGE                 60 yrs              NRG        

 

                    GFR NonAA           47                  NRG        

 

                    GFR AA              57                  NRG        

 

                    eGFR                47 mL/min/1.7           NRG        

 

                    eGFR AA*            57 mL/min/1.7           NRG        

 

                                        NOVEL CORONAVIRUS (COVID-19), COOKIE -  13:26         

 

                    SARS-CoV-2, COOKIE           Not Detected            Not Detect

ed        

 

                                        SARS-CoV-2, COOKIE - 20 13:26        

 

 

                    SARS-CoV-2, COOKIE           Not Detected            Not Detect

ed        

 

                                        Inpatient - 20 13:26         

 

                    Inpatient           Comment                      

 

                                        Coronavirus SARS-CoV-2 SO 2019 - 

0 13:06         

 

                    Coronavirus Ab [Units/volume] in Serum           Negative   

         Negative   

     



                                        



Encounters

      



                ACCT No.           Visit Date/Time           Discharge          

 Status         

             Pt. Type           Provider           Facility           Loc./Unit 

          

Complaint        

 

             [id number]           2020 15:49:00                              

       Document

 Registration                                                                   

 

 

             [id number]           2020 14:09:31                              

       Document

 Registration                                                                   

 

 

                [id number]           2018 10:27:00           2018 10:27

:00           

[facility]             Outpatient           DERREK GODINEZ                     

      

LAB                                              

 

                [id number]           2018 09:18:00           2018 09:18

:00           

DIS             Outpatient           DERREK GODINEZ                     

      

LAB                                              

 

             [id number]           2019 09:15:00                              

       Document

 Registration                                                                   

 

 

             [id number]           2020 13:26:56                              

       Document

 Registration                                                                   

 

 

             [id number]           2020 11:46:47                              

       Document

 Registration                                                                   

 

 

             [id number]           2020 13:51:18                              

       Document

 Registration                                                                   

 

 

             [id number]           2020 10:29:40                              

       Document

 Registration                                                                   

 

 

             [id number]           2020 11:32:23                              

       Document

 Registration                                                                   

 

 

             [id number]           2020 11:16:29                              

       Document

 Registration                                                                   

 

 

             [id number]           2020 10:05:56                             

        

Document Registration                                                           

         

 

             [id number]           2020 09:56:07                              

       Document

 Registration                                                                   

 

 

             [id number]           2020 17:00:03                              

       Document

 Registration                                                                   

 

 

             [id number]           2020 14:18:20                              

       Document

 Registration                                                                   

 

 

             [id number]           2020 07:56:39                              

       Document

 Registration                                                                   

 

 

             [id number]           2020 05:17:20                              

       Document

 Registration                                                                   

 

 

             [id number]           2020 13:05:05                              

       Document

 Registration                                                                   

 

 

             [id number]           2019 13:17:43                              

       Document

 Registration                                                                   

 

 

             [id number]           2019 09:07:25                              

       Document

 Registration                                                                   

 

 

                    [id number]           10/04/2017 06:19:50           10/04/201

7 12:30:00          

                DIS             Outpatient           KATEY CONNOLLY       

    [facility]           UG[facility] Surgery               RAL umbilic

al 

hernia repair        

 

             [id number]           2017 10:47:03                        [facility]    

       Unknown

                                                                         

 

                    [id number]           10/24/2016 13:57:00           10/24/2

016 23:59:59        

                [facility]             Outpatient           DERREK BARLOW   

        [facility]                            

 

             [id number]           2019 09:15:00                              

       Document

 Registration                                                                   

 

 

             [id number]           08/15/2017 15:10:00                           

          

Document Registration                                                           

         

 

             [id number]           2020 06:06:00                         

            

Document Registration                                                           

         

 

                    [id number]           2020 08:06:00            14:04:00        

                DIS             Outpatient           TAWIL MD, ELIAS A          

 [facility]           PREOP                     [facility]        

 

             [id number]           06/10/2020 08:00:00                        P

EN           

Preadmit                  TAWIL MD, ELIAS A           [facility]                       [facility]        

 

                [id number]           2020 09:08:10           2020 23:59:

59           CLS

                Outpatient           Derrek Barlow                          

         

                                                 

 

                [id number]           2020 09:39:38           2020 23:59:

59           CLS

                Outpatient           Derrek Barlow                          

         

                                                 

 

                [id number]           2020 13:56:53           2020 23:59:

59           CLS

                Outpatient           Andrew Manrique                            

         

                                                 

 

                [id number]           2020 14:04:28           2020 23:59:

59           CLS

                Outpatient           Guillermobeth Andrew                            

         

                                                 

 

                [id number]           2020 11:31:51           2020 23:59:

59           CLS

                Outpatient           Derrek Barlow                          

         

                                                 

 

                [id number]           04/15/2020 12:14:18           04/15/2020 23:59:

59           CLS

                Outpatient           KINGSLEY Robertson                           

         

                                                 

 

                [id number]           2020 10:33:31           2020 23:59:

59           CLS

                Outpatient           Derrek Barlow                          

         

                                                 

 

                [id number]           2020 15:57:27           2020 23:59:

59           CLS

                Outpatient           GuillermoAndrew campos                            

         

                                                 

 

                [id number]           2020 11:33:48           2020 23:59:

59           CLS

                Outpatient           Guillermobeth Andrew                            

         

                                                 

 

                [id number]           2020 16:41:27           2020 23:59:

59           CLS

                Outpatient           KINGSLEY Robertson                           

         

                                                 

 

                [id number]           2020 15:26:06           2020 23:59:

59           CLS

                Outpatient           Derrek Barlow                          

         

                                                 

 

                [id number]           2020 09:34:06           2020 23:59:

59           CLS

                Outpatient           Hoffmeister, Derrek                          

         

                                                 

 

                [id number]           10/01/2019 17:06:19           10/01/2019 23:59:

59           LULU

                Outpatient           Derrek Barlow                          

         

                                                 

 

                [id number]           2019 09:01:52           2019 23:59:

59           LULU

                Outpatient           Derrek Barlow                          

         

                                                 

 

                [id number]           2019 09:03:09           2019 23:59:

59           LULU

                Outpatient           Derrek Barlow                          

         

                                                 

 

                [id number]           2019 09:13:00           2019 23:59:

59           CLS

                Outpatient           Derrek Barlow

## 2020-06-10 NOTE — XMS REPORT
[facility] Clinical Summary

                             Created on: 2017



Loki Tao

External Reference #: [id number]

: 1960

Sex: Male



Demographics





                          Address                   [address] [place] [address]

 

                          Home Phone                [email or web address]

 

                          Preferred Language        English

 

                          Marital Status            M

 

                          Confucianist Affiliation     Unknown

 

                          Race                      Unknown

 

                          Ethnic Group              Not  or 





Author





                          Author                    Admin, Loki AQUINO

 

                          Organization              [facility]

 

                          Address                   Unknown

 

                          Phone                     Unavailable







Allergies, Adverse Reactions, Alerts





           Allergy Name Reaction Description Start Date Severity   Status     Pr

ovider

 

           NKA                    Critical   Active     Kenny aguilera MD







Conditions or Problems





        Problem Name Problem Code Onset Date Status  Entry Date Provider Comment

 Standard

Description                             Annotate

 

             Umbilical hernia without obstruction or gangrene                 Resolved     

2017/10/31          Kenny Dwyer MD                     Umbilical hernia 

without mention of 

obstruction or gangrene                  

 

        Ventral hernia 553.20   Resolved 2017/10/31 Kenny laboy MD         

Unspecified ventral hernia without mention of obstruction or gangrene  

 

          Ventral hernia ICD-553.20  2017/10/31 Inactive  Kenny Conway MD 

2017/10/31                               

 

             Umbilical hernia without obstruction or gangrene              2017/

09/26   2017/10/31   

Inactive            Kenny Dwyer MD 2017/10/31           







Medication List





        Medication Instructions Start Date Stop Date Generic Name NDC     Status

  Provider 

Patient Instruction

 

           COZAAR 50 MG ORAL TABLET 1 tab daily             LOSARTAN P

OTASSIUM [id number]

                Active          Caitlyn Moreno RN                 Active

 

             OMEPRAZOLE 20 MG ORAL CAPSULE DELAYED RELEASE 1 tab daily                  

OMEPRAZOLE   [id number]  Active       Caitlyn Moreno RN              Active







Advance Directives





                          Directive Description     Start Date

 

                          PERMISSION TO SHARE       







Vital Signs





           Date       Name       Value      Unit       Range      Description

 

           2017/10/31 blood pressure, diastolic 80         mm[Hg]               

 BP sarah

 

           2017/10/31 blood pressure, systolic 160        mm[Hg]                

BP sys

 

           2017/10/31 height E&M 69         [in_us]               Bdy height

 

           2017/10/31 pulse rate E&M 70         /min                  Heart rate

 

           2017/10/31 temperature E&M 99.1       [degF]                Body temp

erature

 

           2017/10/31 weight E&M 312.4      [lb_av]               Weight Measure

d

 

            blood pressure, diastolic 90         mm[Hg]               

 BP sarah

 

            blood pressure, systolic 150        mm[Hg]                

BP sys

 

            height E&M 69         [in_us]               Bdy height

 

            pulse rate E&M 74         /min                  Heart rate

 

            temperature E&M 98.8       [degF]                Body temp

erature

 

            weight E&M 305        [lb_av]               Weight Measure

d







Encounters





             Code         Encounter    Date         Provider     Facility

 

             CPT-23276    Level 4 New Patient  13:26:59 CDT Kenny Dwyer MD 

[facility]







Procedures





             Code         Procedure Name Date         Entry Date   Standard Desc

ription

 

             CPT-08810    Postop F/U Visit 2017/10/31 14:49:05 CDT 2017/10/31

## 2020-06-10 NOTE — XMS REPORT
[facility] Clinical Summary

                             Created on: 2017



Loki Tao

External Reference #: [id number]

: 1960

Sex: Male



Demographics





                          Address                   [address] [place] [address]

 

                          Home Phone                [email or web address]

 

                          Preferred Language        English

 

                          Marital Status            M

 

                          Restorationism Affiliation     Unknown

 

                          Race                      Unknown

 

                          Ethnic Group              Not  or 





Author





                          Author                    Admin, Loki AQUINO

 

                          Organization              [facility]

 

                          Address                   Unknown

 

                          Phone                     Unavailable







Allergies, Adverse Reactions, Alerts





           Allergy Name Reaction Description Start Date Severity   Status     Pr

ovider

 

           NKA                    Critical   Active     Kenny aguilera MD







Conditions or Problems





        Problem Name Problem Code Onset Date Status  Entry Date Provider Comment

 Standard

Description                             Annotate

 

             Umbilical hernia without obstruction or gangrene                 Resolved     

2017/10/31          Kenny Dwyer MD                     Umbilical hernia 

without mention of 

obstruction or gangrene                  

 

        Ventral hernia 553.20   Resolved 2017/10/31 Kenny laboy MD         

Unspecified ventral hernia without mention of obstruction or gangrene  

 

             Umbilical hernia without obstruction or gangrene              2017/

09/26   2017/10/31   

Inactive            Kenny Dwyer MD 2017/10/31           

 

          Ventral hernia ICD-553.20  2017/10/31 Inactive  Kenny Conway MD 

2017/10/31                               







Medication List





        Medication Instructions Start Date Stop Date Generic Name NDC     Status

  Provider 

Patient Instruction

 

          COZAAR 50 MG ORAL TABS 1 tab daily            LOSARTAN POTAS

SIUM [id number] 

Active              Caitlyn Moreno RN                     Active

 

          OMEPRAZOLE 20 MG ORAL CPDR 1 tab daily            OMEPRAZOLE

 [id number] 

Active              Caitlyn Moreno RN                     Active







Advance Directives





                          Directive Description     Start Date

 

                          PERMISSION TO SHARE       







Vital Signs





           Date       Name       Value      Unit       Range      Description

 

           2017/10/31 blood pressure, diastolic 80         mm[Hg]               

 BP sarah

 

           2017/10/31 blood pressure, systolic 160        mm[Hg]                

BP sys

 

           2017/10/31 height E&M 69         [in_us]               Bdy height

 

           2017/10/31 pulse rate E&M 70         /min                  Heart rate

 

           2017/10/31 temperature E&M 99.1       [degF]                Body temp

erature

 

           2017/10/31 weight E&M 312.4      [lb_av]               Weight Measure

d

 

            blood pressure, diastolic 90         mm[Hg]               

 BP sarah

 

            blood pressure, systolic 150        mm[Hg]                

BP sys

 

            height E&M 69         [in_us]               Bdy height

 

            pulse rate E&M 74         /min                  Heart rate

 

            temperature E&M 98.8       [degF]                Body temp

erature

 

            weight E&M 305        [lb_av]               Weight Measure

d







Encounters





             Code         Encounter    Date         Provider     Facility

 

             CPT-08328    Level 4 New Patient  13:26:59 CDT Kenny Dwyer MD 

[facility]







Procedures





             Code         Procedure Name Date         Entry Date   Standard Desc

ription

 

             CPT-77651    Postop F/U Visit 2017/10/31 14:49:05 CDT 2017/10/31

## 2020-06-10 NOTE — XMS REPORT
[facility] Clinical Summary

                             Created on: 10/23/2017



Loki Tao

External Reference #: [id number]

: 1960

Sex: Male



Demographics





                          Address                   [address] [place] [address]

 

                          Home Phone                [email or web address]

 

                          Preferred Language        English

 

                          Marital Status            M

 

                          Bahai Affiliation     Unknown

 

                          Race                      Unknown

 

                          Ethnic Group              Not  or 





Author





                          Author                    Admin, Loki AQUINO

 

                          Organization              [facility]

 

                          Address                   Unknown

 

                          Phone                     Unavailable







Allergies, Adverse Reactions, Alerts





           Allergy Name Reaction Description Start Date Severity   Status     Pr

ovider

 

           NKA                    Critical   Active     Kenny aguilera MD







Conditions or Problems





        Problem Name Problem Code Onset Date Status  Entry Date Provider Comment

 Standard

Description                             Annotate

 

           Umbilical hernia without obstruction or gangrene            

6 Active      

Kenny Dwyer MD                              Umbilical hernia without men

tion of obstruction or 

gangrene                                 

 

        Ventral hernia 553.20   Active   Kenny Dwyer MD         

Unspecified ventral hernia without mention of obstruction or gangrene  







Medication List





        Medication Instructions Start Date Stop Date Generic Name NDC     Status

  Provider 

Patient Instruction

 

          COZAAR 50 MG ORAL TABS 1 tab daily            LOSARTAN POTAS

SIUM [id number] 

Active              Caitlyn Moreno RN                     Active

 

          OMEPRAZOLE 20 MG ORAL CPDR 1 tab daily            OMEPRAZOLE

 [id number] 

Active              Caitlyn Moreno RN                     Active







Advance Directives





                          Directive Description     Start Date

 

                          PERMISSION TO SHARE       







Vital Signs





           Date       Name       Value      Unit       Range      Description

 

            blood pressure, diastolic 90         mm[Hg]               

 BP sarah

 

            blood pressure, systolic 150        mm[Hg]                

BP sys

 

            height E&M 69         [in_us]               Bdy height

 

            pulse rate E&M 74         /min                  Heart rate

 

            temperature E&M 98.8       [degF]                Body temp

erature

 

            weight E&M 305        [lb_av]               Weight Measure

d







Encounters





             Code         Encounter    Date         Provider     Facility

 

             CPT-80074    Level 4 New Patient  13:26:59 CDT Kenny Dwyer MD 

[facility]

## 2020-06-10 NOTE — XMS REPORT
[facility] Ambulatory Summary

                             Created on: 2019



Loki Tao

External Reference #: [id number]

: 1960

Sex: Male



Demographics





                          Address                   [address] [place]  [address]

 

                          Home Phone                [phone]

 

                          Preferred Language        English

 

                          Marital Status            Never 

 

                          Baptist Affiliation     Unknown

 

                          Race                      White

 

                          Ethnic Group              Unknown





Author





                          Author                    Loki Coronel

 

                          Organization              [facility]

oup

 

                          Address                   [address]

[place], [place]  [address]



 

                          Phone                     [phone]







Care Team Providers





                    Care Team Member Name Role                Phone

 

                    Francis Coronel   PCP                 [phone]

 

                    Francis Coronel   PreferredProvider   [phone]







Allergies and Adverse Reactions





                    Name                Reaction            Notes

 

                    No known drug allergy                      







Plan of Treatment





             Planned Activity Comments     Planned Date Planned Time Plan/Goal

 

             URINALYSIS ROUTINE C&S IF IND              4/3/2019     12:00 AM   

   

 

             Urine dipstick test without microscopy              4/3/2019     12

:00 AM      







Medications





                                        Active 

 

             Name         Start Date   Estimated Completion Date SIG          Co

mments

 

             losartan 100 mg oral tablet                           take 1 tablet

 (100 mg) by oral route once daily 



 

                omeprazole 20 mg oral capsule,delayed release(DR/EC)            

                     take 1 capsule (20 mg) 

by oral route once daily before a meal   

 

                atorvastatin 20 mg oral tablet                                 t

jackelyn 1 tablet (20 mg) by oral route once 

daily                                    

 

                levothyroxine 25 mcg oral tablet                                

 take 1 tablet (25 mcg) by oral route once 

daily                                    

 

                metformin 500 mg oral tablet                                 porfirio

e 1 tablet (500 mg) by oral route 2 times 

per day with morning and evening meals   

 

                Bactrim -160 mg oral tablet 4/3/2019        2019     

  take 1 tablet by oral route

every 12 hours for 10 days               







Problem List





                    Description         Status              Onset

 

                    Acute cystitis without hematuria Active              4/3/201

9

 

                                        Diabetes mellitus due to underlying cond

ition without complication, without 

long-term current use of insulin Active                    4/3/2019

 

                    Other specified hypothyroidism Active              4/3/2019

 

                    Pure hypercholesterolemia Active              4/3/2019

 

                    Essential hypertension Active              4/3/2019







Vital Signs





     Date Time BP-Sys(mm[Hg] BP-Alexsandra(mm[Hg]) HR(bpm) RR(rpm) Temp WT   HT   HC   

BMI  BSA  BMI

 Percentile                             O2 Sat(%)

 

      4/3/2019 8:34:00  mmHg 78 mmHg 76 bpm       98 F  292 lbs 69 in     

  43.1204 

kg/m              2.5393 m                              93 %







Social History





                    Name                Description         Comments

 

                    Tobacco             Never smoker         







History of Procedures





                    Date Ordered        Description         Order Status

 

                    4/3/2019 9:10 AM    URINALYSIS AUTO W/O SCOPE Reviewed







Results Summary





                          Date and Description      Results

 

                          4/3/2019 9:10 AM          Clarity Ur clear Urine-Color

 yellow Glucose Ur-sCnc neg Bilirub

 Ur Ql neg Ketones Ur Ql Strip neg Sp Gr Ur Qn 1.010 Hgb Ur Ql Strip moderate pH
 Ur-LsCnc 5.0 Prot Ur Ql Strip 30mg Urobilinogen Ur-mCnc 0.2 Nitrite Ur Ql Strip
 pos WBC # Ur mod 







History Of Immunizations

Not available.



History of Past Illness





                    Name                Date of Onset       Comments

 

                    Type 2 diabetes mellitus                      

 

                    Hypothyroid                              

 

                    High cholesterol                         

 

                    HTN (hypertension)                       

 

                    Acute cystitis without hematuria 2019           

 

                                        Diabetes mellitus due to underlying cond

ition without complication, without 

long-term current use of insulin 2019                 

 

                    Other specified hypothyroidism 2019           

 

                    Pure hypercholesterolemia 2019           

 

                    Essential hypertension 2019           

 

                    Dysuria             Apr  3 2019  8:54AM  

 

                    Acute cystitis without hematuria Apr  3 2019  8:37AM  

 

                                        Diabetes mellitus due to underlying cond

ition without complication, without 

long-term current use of insulin Apr  3 2019  8:37AM        

 

                    Essential hypertension Apr  3 2019  8:37AM  

 

                    Pure hypercholesterolemia Apr  3 2019  8:37AM  

 

                    Other specified hypothyroidism Apr  3 2019  8:37AM  

 

                    Dysuria             Apr  3 2019  9:03AM  







Payers





           Insurance Name Company Name Plan Name  Plan Number Policy Number Yahir

cy Group 

Number                                  Start Date

 

                    [employer] [address] [facility]           [id number]

2           N/A







History of Encounters





                    Visit Date          Visit Type          Provider

 

                    4/3/2019            Office visit        Francis KNIGHT

## 2020-06-10 NOTE — XMS REPORT
MU2 Ambulatory Summary

                             Created on: 2019



Loki Tao

External Reference #: [id number]

: 1960

Sex: Male



Demographics





                          Address                   [address] [place]  [address]

 

                          Home Phone                [phone]

 

                          Preferred Language        English

 

                          Marital Status            

 

                          Zoroastrianism Affiliation     Unknown

 

                          Race                      White

 

                          Ethnic Group              Unknown





Author





                          Author                    Loki Coronel

 

                          Organization              [facility] 

[facility]

 

                          Address                   [address]

[place], [place]  [id number]



 

                          Phone                     [phone]







Care Team Providers





                    Care Team Member Name Role                Phone

 

                    Francis Coronel   PCP                 [phone]

 

                    Francis Coronel   PreferredProvider   [phone]







Allergies and Adverse Reactions





                    Name                Reaction            Notes

 

                    No known drug allergy                      







Plan of Treatment





             Planned Activity Comments     Planned Date Planned Time Plan/Goal

 

             Right middle finger pain and swelling                              

           







Medications





                                        Active 

 

             Name         Start Date   Estimated Completion Date SIG          Co

mments

 

             losartan 100 mg oral tablet                           take 1 tablet

 (100 mg) by oral route once daily 



 

                omeprazole 20 mg oral capsule,delayed release(DR/EC)            

                     take 1 capsule (20 mg) 

by oral route once daily before a meal   

 

                atorvastatin 20 mg oral tablet                                 t

jackelyn 1 tablet (20 mg) by oral route once 

daily                                    

 

                levothyroxine 25 mcg oral tablet                                

 take 1 tablet (25 mcg) by oral route once 

daily                                    

 

                metformin 500 mg oral tablet                                 porfirio

e 1 tablet (500 mg) by oral route 2 times 

per day with morning and evening meals   







                                         

 

             Name         Start Date   Expiration Date SIG          Comments

 

                Bactrim -160 mg oral tablet 4/3/2019        2019     

  take 1 tablet by oral route

every 12 hours for 10 days               







Problem List





                    Description         Status              Onset

 

                    Acute cystitis without hematuria Active              4/3/201

9

 

                                        Diabetes mellitus due to underlying cond

ition without complication, without 

long-term current use of insulin Active                    4/3/2019

 

                    Other specified hypothyroidism Active              4/3/2019

 

                    Pure hypercholesterolemia Active              4/3/2019

 

                    Essential hypertension Active              4/3/2019







Vital Signs





     Date Time BP-Sys(mm[Hg] BP-Alexsandra(mm[Hg]) HR(bpm) RR(rpm) Temp WT   HT   HC   

BMI  BSA  BMI

 Percentile                             O2 Sat(%)

 

      2019 8:09:00  mmHg 88 mmHg 63 bpm       98.8 F 292 lbs 69 in   

    43.1204 

kg/m              2.5393 m                              94 %

 

      4/3/2019 8:34:00  mmHg 78 mmHg 76 bpm       98 F  292 lbs 69 in     

  43.12 kg/m2 

2.54 m2                                             93 %







Social History





                    Name                Description         Comments

 

                    Tobacco             Never smoker         







History of Procedures





                    Date Ordered        Description         Order Status

 

                    4/3/2019 12:00 AM   URNLS DIP STICK/TABLET RGNT AUTO W/O BELEN

ROSCOPY Returned

 

                    4/3/2019 12:00 AM   URINALYSIS AUTO W/O SCOPE Reviewed

 

                    4/3/2019 9:10 AM    URINALYSIS AUTO W/O SCOPE Reviewed







Results Summary





                          Date and Description      Results

 

                          4/3/2019 9:10 AM          Clarity Ur clear Urine-Color

 yellow Glucose Ur-sCnc neg Bilirub

 Ur Ql neg Ketones Ur Ql Strip neg Sp Gr Ur Qn 1.010 Hgb Ur Ql Strip moderate pH
 Ur-LsCnc 5.0 Prot Ur Ql Strip 30mg Urobilinogen Ur-mCnc 0.2 Nitrite Ur Ql Strip
 pos WBC # Ur mod 







History Of Immunizations

Not available.



History of Past Illness





                    Name                Date of Onset       Comments

 

                    Type 2 diabetes mellitus                      

 

                    Hypothyroid                              

 

                    High cholesterol                         

 

                    HTN (hypertension)                       

 

                    Acute cystitis without hematuria 2019           

 

                                        Diabetes mellitus due to underlying cond

ition without complication, without 

long-term current use of insulin 2019                 

 

                    Other specified hypothyroidism 2019           

 

                    Pure hypercholesterolemia 2019           

 

                    Essential hypertension 2019           

 

                    Dysuria             Apr  3 2019  8:54AM  

 

                    Acute cystitis without hematuria Apr  3 2019  8:37AM  

 

                                        Diabetes mellitus due to underlying cond

ition without complication, without 

long-term current use of insulin Apr  3 2019  8:37AM        

 

                    Essential hypertension Apr  3 2019  8:37AM  

 

                    Pure hypercholesterolemia Apr  3 2019  8:37AM  

 

                    Other specified hypothyroidism Apr  3 2019  8:37AM  

 

                    Dysuria             Apr  3 2019  9:03AM  

 

                    Finger pain, right  2019  8:12AM  

 

                                        Diabetes mellitus due to underlying cond

ition without complication, without 

long-term current use of insulin 2019  8:12AM        

 

                    Essential hypertension 2019  8:12AM  

 

                    Other specified hypothyroidism 2019  8:12AM  

 

                    Pure hypercholesterolemia 2019  8:12AM  







Payers





           Insurance Name Company Name Plan Name  Plan Number Policy Number Yahir

cy Group 

Number                                  Start Date

 

                    [facility] [id number] [facility]           [id number]

2           N/A







History of Encounters





                    Visit Date          Visit Type          Provider

 

                    2019           Office visit        Francis KNIGHT

 

                    4/3/2019            Office visit        Francis KNIGHT

## 2020-06-10 NOTE — OPERATIVE REPORT
DATE OF SERVICE:  06/10/2020



PREOPERATIVE DIAGNOSIS:

Benign prostatic hypertrophy with neurogenic bladder and retention with

incontinence.



POSTOPERATIVE DIAGNOSIS:

Benign prostatic hypertrophy with neurogenic bladder and retention with

incontinence.



OPERATION PERFORMED:

Transurethral resection of the prostate.



SURGEON:

Elias Tawil, MD.



ANESTHESIA:

Spinal.



COMPLICATIONS:

None.



DESCRIPTION OF PROCEDURE:

Under satisfactory spinal anesthesia, the patient in lithotomy position,

genitalia were prepped and draped in the usual sterile fashion.  Urethra was

dilated with Karena sound to #30 Yakut easily.  A 26-Yakut resectoscope was

then introduced, the Root type.  Again, visualized the quite enlarged

vascular prostate with the lateral lobe meeting in the midline and the median

projection with medial lobe and bar.  I went ahead and first resected the median

projection and leveled it and then the lateral lobes first I went between 11

o'clock and 1 o'clock, and then gradually down on both sides to the 6 o'clock

position and then the floor and apical tissue were resected.  Resection was

adequate.  Hemostasis satisfactory.  Prostatic capsule was visualized in many

points.  Bleeders were cauterized as the resection was proceeding.  The

prostatic chips were evacuated and sent for pathology.  Cystoscopy confirmed

intact orifices.  Veru and sphincter with good reflex.  Resectoscope was then

removed and a 22-Yakut 3-way 30 mL balloon catheter was inserted.  The balloon

inflated to 50 mL, connected to continuous bladder irrigation with normal saline

on traction.  The return of which was pretty clear with traction.



ESTIMATED BLOOD LOSS:

150 mL, none of which was replaced.



The patient tolerated the procedure and anesthesia well and was sent to recovery

room in stable condition.





Job ID: [id number]

DocumentID: [id number]

Dictated Date:  06/10/2020 09:14:52

Transcription Date: 06/10/2020 13:59:59

Dictated By: ELIAS TAWIL, MD

## 2020-06-10 NOTE — XMS REPORT
MU2 Ambulatory Summary

                             Created on: 2019



Loki Tao

External Reference #: [id number]

: 1960

Sex: Male



Demographics





                          Address                   [address] [place]  [address]

 

                          Home Phone                [phone]

 

                          Preferred Language        English

 

                          Marital Status            

 

                          Yarsanism Affiliation     Unknown

 

                          Race                      White

 

                          Ethnic Group              Unknown





Author





                          Author                    Loki Coronel

 

                          Organization              [facility] 

[facility]

 

                          Address                   [address]

[place], [place]  [id number]



 

                          Phone                     [phone]







Care Team Providers





                    Care Team Member Name Role                Phone

 

                    Francis Coronel   PCP                 [phone]

 

                    Francis Coronel   PreferredProvider   [phone]







Allergies and Adverse Reactions





                    Name                Reaction            Notes

 

                    No known drug allergy                      







Plan of Treatment





             Planned Activity Comments     Planned Date Planned Time Plan/Goal

 

             Right middle finger pain and swelling              2019    1:0

0 PM       

 

             HGB A1C                   2019    12:00 AM      

 

             TSH                       2019    12:00 AM      







Medications





                                        Active 

 

             Name         Start Date   Estimated Completion Date SIG          Co

mments

 

             losartan 100 mg oral tablet                           take 1 tablet

 (100 mg) by oral route once daily 



 

                atorvastatin 20 mg oral tablet                                 t

jackelyn 1 tablet (20 mg) by oral route once 

daily                                    

 

                metformin 500 mg oral tablet                                 porfirio

e 1 tablet (500 mg) by oral route 2 times 

per day with morning and evening meals   

 

                levothyroxine 25 mcg oral tablet 2019                      

 TAKE 1 TABLET BY MOUTH IN THE 

MORNING ON AN EMPTY STOMACH              

 

                omeprazole 20 mg oral capsule,delayed release(DR/EC) 2019  

                     TAKE 1 CAPSULE 

BY MOUTH ONCE DAILY                      







                                         

 

             Name         Start Date   Expiration Date SIG          Comments

 

                Bactrim -160 mg oral tablet 4/3/2019        2019     

  take 1 tablet by oral route

every 12 hours for 10 days               







                                        Discontinued 

 

             Name         Start Date   Discontinued Date SIG          Comments

 

                losartan 50 mg oral tablet 2019       TAKE 

1 TABLET BY MOUTH ONCE DAILY

                                         







Problem List





                    Description         Status              Onset

 

                    Acute cystitis without hematuria Active              4/3/201

9

 

                                        Diabetes mellitus due to underlying cond

ition without complication, without 

long-term current use of insulin Active                    4/3/2019

 

                    Other specified hypothyroidism Active              4/3/2019

 

                    Pure hypercholesterolemia Active              4/3/2019

 

                    Essential hypertension Active              4/3/2019







Vital Signs





     Date Time BP-Sys(mm[Hg] BP-Alexsandra(mm[Hg]) HR(bpm) RR(rpm) Temp WT   HT   HC   

BMI  BSA  BMI

 Percentile                             O2 Sat(%)

 

     2019 8:05:00  mmHg 74 mmHg 62 bpm      98.8 F 288.75 lbs        

                  96 %

 

      2019 8:09:00  mmHg 88 mmHg 63 bpm       98.8 F 292 lbs 69 in   

    43.12 

kg/m2               2.54 m2                                 94 %

 

      4/3/2019 8:34:00  mmHg 78 mmHg 76 bpm       98 F  292 lbs 69 in     

  43.1204 

kg/m              2.5393 m                              93 %







Social History





                    Name                Description         Comments

 

                    Tobacco             Never smoker         







History of Procedures





                    Date Ordered        Description         Order Status

 

                    4/3/2019 12:00 AM   URNLS DIP STICK/TABLET RGNT AUTO W/O BELEN

ROSCOPY Returned

 

                    4/3/2019 12:00 AM   URINALYSIS AUTO W/O SCOPE Reviewed

 

                    4/3/2019 9:10 AM    URINALYSIS AUTO W/O SCOPE Reviewed

 

                    2019 12:00 AM  ROUTINE VENIPUNCTURE Reviewed







Results Summary





                          Date and Description      Results

 

                          4/3/2019 9:10 AM          Clarity Ur clear Urine-Color

 yellow Glucose Ur-sCnc neg Bilirub

 Ur Ql neg Ketones Ur Ql Strip neg Sp Gr Ur Qn 1.010 Hgb Ur Ql Strip moderate pH
 Ur-LsCnc 5.0 Prot Ur Ql Strip 30mg Urobilinogen Ur-mCnc 0.2 Nitrite Ur Ql Strip
 pos WBC # Ur mod 







History Of Immunizations

Not available.



History of Past Illness





                    Name                Date of Onset       Comments

 

                    Type 2 diabetes mellitus                      

 

                    Hypothyroid                              

 

                    High cholesterol                         

 

                    HTN (hypertension)                       

 

                    Acute cystitis without hematuria 2019           

 

                                        Diabetes mellitus due to underlying cond

ition without complication, without 

long-term current use of insulin 2019                 

 

                    Other specified hypothyroidism 2019           

 

                    Pure hypercholesterolemia 2019           

 

                    Essential hypertension 2019           

 

                    Dysuria             Apr  3 2019  8:54AM  

 

                    Acute cystitis without hematuria Apr  3 2019  8:37AM  

 

                                        Diabetes mellitus due to underlying cond

ition without complication, without 

long-term current use of insulin Apr  3 2019  8:37AM        

 

                    Essential hypertension Apr  3 2019  8:37AM  

 

                    Pure hypercholesterolemia Apr  3 2019  8:37AM  

 

                    Other specified hypothyroidism Apr  3 2019  8:37AM  

 

                    Dysuria             Apr  3 2019  9:03AM  

 

                    Finger pain, right  2019  8:12AM  

 

                                        Diabetes mellitus due to underlying cond

ition without complication, without 

long-term current use of insulin 2019  8:12AM        

 

                    Essential hypertension 2019  8:12AM  

 

                    Other specified hypothyroidism 2019  8:12AM  

 

                    Pure hypercholesterolemia 2019  8:12AM  

 

                                        Diabetes mellitus due to underlying cond

ition without complication, without 

long-term current use of insulin Aug 13 2019  8:07AM        

 

                    Essential hypertension Aug 13 2019  8:07AM  

 

                    Other specified hypothyroidism Aug 13 2019  8:07AM  

 

                    Pure hypercholesterolemia Aug 13 2019  8:07AM  







Payers





           Insurance Name Company Name Plan Name  Plan Number Policy Number Yahir

cy Group 

Number                                  Start Date

 

                    [facility] [id number] [facility]           [id number]

2           N/A







History of Encounters





                    Visit Date          Visit Type          Provider

 

                    2019           Office visit        Francis MELTON

 

                    2019           Office visit        Francis MELTON

 

                    4/3/2019            Office visit        Francis MELTON

## 2020-06-11 VITALS — DIASTOLIC BLOOD PRESSURE: 64 MMHG | SYSTOLIC BLOOD PRESSURE: 124 MMHG

## 2020-06-11 VITALS — DIASTOLIC BLOOD PRESSURE: 74 MMHG | SYSTOLIC BLOOD PRESSURE: 134 MMHG

## 2020-06-11 VITALS — SYSTOLIC BLOOD PRESSURE: 117 MMHG | DIASTOLIC BLOOD PRESSURE: 69 MMHG

## 2020-06-11 VITALS — DIASTOLIC BLOOD PRESSURE: 68 MMHG | SYSTOLIC BLOOD PRESSURE: 132 MMHG

## 2020-06-11 VITALS — DIASTOLIC BLOOD PRESSURE: 72 MMHG | SYSTOLIC BLOOD PRESSURE: 149 MMHG

## 2020-06-11 VITALS — SYSTOLIC BLOOD PRESSURE: 126 MMHG | DIASTOLIC BLOOD PRESSURE: 77 MMHG

## 2020-06-11 RX ADMIN — LEVOTHYROXINE SODIUM SCH MCG: 25 TABLET ORAL at 06:17

## 2020-06-11 RX ADMIN — DOCUSATE SODIUM SCH MG: 100 CAPSULE ORAL at 20:22

## 2020-06-11 RX ADMIN — PANTOPRAZOLE SODIUM SCH MG: 40 TABLET, DELAYED RELEASE ORAL at 08:28

## 2020-06-11 RX ADMIN — METFORMIN HYDROCHLORIDE SCH MG: 500 TABLET, EXTENDED RELEASE ORAL at 06:17

## 2020-06-11 RX ADMIN — DOCUSATE SODIUM SCH MG: 100 CAPSULE ORAL at 08:28

## 2020-06-11 RX ADMIN — HYDROCODONE BITARTRATE AND ACETAMINOPHEN PRN EA: 10; 325 TABLET ORAL at 09:59

## 2020-06-11 RX ADMIN — METFORMIN HYDROCHLORIDE SCH MG: 500 TABLET, EXTENDED RELEASE ORAL at 17:48

## 2020-06-11 RX ADMIN — LOSARTAN POTASSIUM SCH MG: 100 TABLET, FILM COATED ORAL at 08:28

## 2020-06-11 NOTE — NUR
patient able to void 50CC at this time red urine. bladder scanned again showed 135CC  
currently in bladder. called tawil office at this time. staff stated he is not at the office

## 2020-06-11 NOTE — NUR
attempted to call tawil again regarding patients inability to void. phone still going 
straight to voice mail. still no reply to previous Text message. patient still not in any 
pain at this time but encouraged to drink fluid and ambulate

## 2020-06-11 NOTE — NUR
Still not able to void post Peralta removal. patient stated he does not feel the urge. patient 
was bladder scanned and only 91CC present in bladder at this time. Dr Tawil, notified 
awaiting reply

## 2020-06-11 NOTE — ANESTHESIA-REGIONAL POST-OP
Regional


Patient Condition


Mental Status:  Alert, Oriented x3


Circulation:  Same as Pre-Op


Headache:  Absent


Sensation:  Full Recovery


Motor Block:  Absent





Post Op Complications


Complications


None





Follow Up Care/Instructions


Patient Instructions


None needed.





Anesthesia/Patient Condition


Patient is doing well, no complaints, stable vital signs, no apparent adverse 

anesthesia problems.   


No complications reported per nursing.











JACKIE ALEJANDRO CRNA         Jun 11, 2020 07:04

## 2020-06-11 NOTE — PROGRESS NOTE - UROLOGY
Progress Note-Urology


Progress Notes/Assess & Plan


Progress/Assessment & Plan


DOING, LOOKING, AND FEELING VERY WELL. URINE CLEAR. PLAN PER ORDERS


Final Diagnosis


BPH, NEUROGENIC BLADDER WITH RETENTION AND INCONTINENCE POST TURP











TAWIL,ELIAS A MD               Jun 11, 2020 10:00

## 2020-06-12 VITALS — DIASTOLIC BLOOD PRESSURE: 71 MMHG | SYSTOLIC BLOOD PRESSURE: 117 MMHG

## 2020-06-12 VITALS — SYSTOLIC BLOOD PRESSURE: 146 MMHG | DIASTOLIC BLOOD PRESSURE: 66 MMHG

## 2020-06-12 RX ADMIN — LEVOTHYROXINE SODIUM SCH MCG: 25 TABLET ORAL at 06:45

## 2020-06-12 RX ADMIN — METFORMIN HYDROCHLORIDE SCH MG: 500 TABLET, EXTENDED RELEASE ORAL at 06:45

## 2020-06-12 RX ADMIN — PANTOPRAZOLE SODIUM SCH MG: 40 TABLET, DELAYED RELEASE ORAL at 09:13

## 2020-06-12 RX ADMIN — DOCUSATE SODIUM SCH MG: 100 CAPSULE ORAL at 09:13

## 2020-06-12 RX ADMIN — LOSARTAN POTASSIUM SCH MG: 100 TABLET, FILM COATED ORAL at 09:13

## 2020-06-12 NOTE — PROGRESS NOTE - UROLOGY
Progress Note-Urology


Progress Notes/Assess & Plan


Progress/Assessment & Plan


DOING WELL, VOIDING WELL, EMPTIES, CONTINENT, URINE TINGED, NO COMPLAINTS. HOME 

WITH INSTRUCTIONS.


Final Diagnosis


BPH, NEUROGENIC BLADDER WITH RETENTION AND INCONTINENCE











TAWIL,ELIAS A MD               Jun 12, 2020 09:42

## 2020-06-12 NOTE — DISCHARGE INST-UROLOGY
Discharge Inst-Urology


Reconcile Patient Problems


Problems Reviewed?:  Yes


Final Diagnosis


BPH, NEUROGENIC BLADDER WITH RETENTION AND INCONTINENCE





Patient Instructions/Follow Up


Plan/Assessment/Instructions


Please make appointment to been seen in office in 2 weeks. Rest till then





Keep bowels soft and moving





Showers, no bath





Stay off ASA, Flomax, and Proscar





Please call pharmacy for Bactrim DS bid for a week, Pyridium 200 TID PRN #[id number]





Increase oral fluids for 48 hours and then as needed.





Diet and Activity as tolerated.





If questions or concerns contact your physician 


Or seek help at emergency department.











TAWIL,ELIAS A MD               Jun 12, 2020 09:46